# Patient Record
Sex: FEMALE | Race: BLACK OR AFRICAN AMERICAN | NOT HISPANIC OR LATINO
[De-identification: names, ages, dates, MRNs, and addresses within clinical notes are randomized per-mention and may not be internally consistent; named-entity substitution may affect disease eponyms.]

---

## 2018-01-08 ENCOUNTER — APPOINTMENT (OUTPATIENT)
Dept: ANTEPARTUM | Facility: CLINIC | Age: 33
End: 2018-01-08
Payer: COMMERCIAL

## 2018-01-08 ENCOUNTER — ASOB RESULT (OUTPATIENT)
Age: 33
End: 2018-01-08

## 2018-01-08 PROCEDURE — 36416 COLLJ CAPILLARY BLOOD SPEC: CPT

## 2018-01-08 PROCEDURE — 76813 OB US NUCHAL MEAS 1 GEST: CPT

## 2018-01-08 PROCEDURE — 76801 OB US < 14 WKS SINGLE FETUS: CPT

## 2018-02-21 ENCOUNTER — ASOB RESULT (OUTPATIENT)
Age: 33
End: 2018-02-21

## 2018-02-21 ENCOUNTER — APPOINTMENT (OUTPATIENT)
Dept: ANTEPARTUM | Facility: CLINIC | Age: 33
End: 2018-02-21
Payer: COMMERCIAL

## 2018-02-21 PROCEDURE — 76811 OB US DETAILED SNGL FETUS: CPT

## 2018-07-08 ENCOUNTER — TRANSCRIPTION ENCOUNTER (OUTPATIENT)
Age: 33
End: 2018-07-08

## 2018-07-12 ENCOUNTER — TRANSCRIPTION ENCOUNTER (OUTPATIENT)
Age: 33
End: 2018-07-12

## 2019-10-15 ENCOUNTER — APPOINTMENT (OUTPATIENT)
Dept: ANTEPARTUM | Facility: CLINIC | Age: 34
End: 2019-10-15
Payer: COMMERCIAL

## 2019-10-15 ENCOUNTER — ASOB RESULT (OUTPATIENT)
Age: 34
End: 2019-10-15

## 2019-10-15 PROCEDURE — 99241 OFFICE CONSULTATION NEW/ESTAB PATIENT 15 MIN: CPT | Mod: 25

## 2019-10-15 PROCEDURE — 76815 OB US LIMITED FETUS(S): CPT

## 2019-10-29 ENCOUNTER — APPOINTMENT (OUTPATIENT)
Dept: ANTEPARTUM | Facility: CLINIC | Age: 34
End: 2019-10-29

## 2019-11-19 ENCOUNTER — ASOB RESULT (OUTPATIENT)
Age: 34
End: 2019-11-19

## 2019-11-19 ENCOUNTER — APPOINTMENT (OUTPATIENT)
Dept: ANTEPARTUM | Facility: CLINIC | Age: 34
End: 2019-11-19
Payer: COMMERCIAL

## 2019-11-19 PROCEDURE — 76811 OB US DETAILED SNGL FETUS: CPT

## 2019-11-19 PROCEDURE — 99241 OFFICE CONSULTATION NEW/ESTAB PATIENT 15 MIN: CPT | Mod: 25

## 2020-03-16 ENCOUNTER — TRANSCRIPTION ENCOUNTER (OUTPATIENT)
Age: 35
End: 2020-03-16

## 2020-03-17 ENCOUNTER — TRANSCRIPTION ENCOUNTER (OUTPATIENT)
Age: 35
End: 2020-03-17

## 2021-04-21 ENCOUNTER — RESULT REVIEW (OUTPATIENT)
Age: 36
End: 2021-04-21

## 2021-09-04 ENCOUNTER — TRANSCRIPTION ENCOUNTER (OUTPATIENT)
Age: 36
End: 2021-09-04

## 2021-12-10 ENCOUNTER — TRANSCRIPTION ENCOUNTER (OUTPATIENT)
Age: 36
End: 2021-12-10

## 2022-05-15 ENCOUNTER — NON-APPOINTMENT (OUTPATIENT)
Age: 37
End: 2022-05-15

## 2022-12-13 ENCOUNTER — NON-APPOINTMENT (OUTPATIENT)
Age: 37
End: 2022-12-13

## 2023-01-04 ENCOUNTER — NON-APPOINTMENT (OUTPATIENT)
Age: 38
End: 2023-01-04

## 2023-01-06 ENCOUNTER — APPOINTMENT (OUTPATIENT)
Dept: OTOLARYNGOLOGY | Facility: CLINIC | Age: 38
End: 2023-01-06
Payer: COMMERCIAL

## 2023-01-06 PROCEDURE — 99204 OFFICE O/P NEW MOD 45 MIN: CPT

## 2023-01-06 NOTE — REVIEW OF SYSTEMS
[Ear Pain] : ear pain [Ear Itch] : ear itch [Dizziness] : dizziness [Negative] : Heme/Lymph [de-identified] : right ear, headaches

## 2023-01-06 NOTE — ASSESSMENT
[FreeTextEntry1] : 37F with covid. Given her covid, I performed a very limited PE. However, by history it sounds like ETD in the setting of covid.

## 2023-03-30 ENCOUNTER — APPOINTMENT (OUTPATIENT)
Dept: OTOLARYNGOLOGY | Facility: CLINIC | Age: 38
End: 2023-03-30
Payer: COMMERCIAL

## 2023-03-30 DIAGNOSIS — M54.2 CERVICALGIA: ICD-10-CM

## 2023-03-30 PROCEDURE — 99213 OFFICE O/P EST LOW 20 MIN: CPT

## 2023-03-30 RX ORDER — SEMAGLUTIDE 1.7 MG/.75ML
INJECTION, SOLUTION SUBCUTANEOUS
Refills: 0 | Status: ACTIVE | COMMUNITY

## 2023-03-30 NOTE — REASON FOR VISIT
[Subsequent Evaluation] : a subsequent evaluation for [FreeTextEntry2] : R ear otalgia, left neck lump

## 2023-03-30 NOTE — HISTORY OF PRESENT ILLNESS
[de-identified] : 37 year old female presents for 2 month follow up for right ear otalgia.\par Pt reports occasionally will have clogged feelings, relieved through irrigation. Has not used flonase or afrin. \par Denies right ear otalgia today. Left ear is stable at this time.\par Denies otalgia, otorrhea, ear infections, hearing loss. \par Pt states has had left neck lump for about 10 years. \par Has pain in area 2-3 weeks ago, but suddenly went away.\par Will occasionally have sudden neck pain that can last for a few days, can be exacerbated with stress, menses, or certain foods such as dairy\par Pain can travel to head. \par Reports has not grown in size. Has not noticed any size fluctuation with fevers or infection. \par Denies dysphagia, dyspnea. Denies recent fevers or infections. \par

## 2023-03-30 NOTE — PHYSICAL EXAM
[Normal] : no abnormal secretions [de-identified] : Palpable firm nodule in level 2, but pulsatile and may be prominent carotid bulb

## 2023-04-05 ENCOUNTER — OUTPATIENT (OUTPATIENT)
Dept: OUTPATIENT SERVICES | Facility: HOSPITAL | Age: 38
LOS: 1 days | End: 2023-04-05
Payer: COMMERCIAL

## 2023-04-05 ENCOUNTER — APPOINTMENT (OUTPATIENT)
Dept: CT IMAGING | Facility: CLINIC | Age: 38
End: 2023-04-05
Payer: COMMERCIAL

## 2023-04-05 DIAGNOSIS — M54.2 CERVICALGIA: ICD-10-CM

## 2023-04-05 PROCEDURE — 70491 CT SOFT TISSUE NECK W/DYE: CPT

## 2023-04-05 PROCEDURE — 70491 CT SOFT TISSUE NECK W/DYE: CPT | Mod: 26

## 2023-04-11 ENCOUNTER — NON-APPOINTMENT (OUTPATIENT)
Age: 38
End: 2023-04-11

## 2023-07-13 ENCOUNTER — APPOINTMENT (OUTPATIENT)
Dept: ORTHOPEDIC SURGERY | Facility: CLINIC | Age: 38
End: 2023-07-13

## 2023-11-01 ENCOUNTER — NON-APPOINTMENT (OUTPATIENT)
Age: 38
End: 2023-11-01

## 2023-12-06 ENCOUNTER — APPOINTMENT (OUTPATIENT)
Dept: OTOLARYNGOLOGY | Facility: CLINIC | Age: 38
End: 2023-12-06
Payer: COMMERCIAL

## 2023-12-06 VITALS
DIASTOLIC BLOOD PRESSURE: 77 MMHG | HEART RATE: 103 BPM | TEMPERATURE: 98 F | SYSTOLIC BLOOD PRESSURE: 126 MMHG | HEIGHT: 65 IN | WEIGHT: 156 LBS | BODY MASS INDEX: 25.99 KG/M2

## 2023-12-06 DIAGNOSIS — H93.11 TINNITUS, RIGHT EAR: ICD-10-CM

## 2023-12-06 PROCEDURE — 92557 COMPREHENSIVE HEARING TEST: CPT

## 2023-12-06 PROCEDURE — 31231 NASAL ENDOSCOPY DX: CPT

## 2023-12-06 PROCEDURE — 99214 OFFICE O/P EST MOD 30 MIN: CPT | Mod: 25

## 2023-12-06 PROCEDURE — 92567 TYMPANOMETRY: CPT | Mod: 22

## 2023-12-06 RX ORDER — AZELASTINE HYDROCHLORIDE 137 UG/1
137 SPRAY, METERED NASAL
Qty: 1 | Refills: 5 | Status: ACTIVE | COMMUNITY
Start: 2023-12-06 | End: 1900-01-01

## 2023-12-06 RX ORDER — METHYLPREDNISOLONE 4 MG/1
4 TABLET ORAL
Qty: 1 | Refills: 0 | Status: ACTIVE | COMMUNITY
Start: 2023-12-06 | End: 1900-01-01

## 2023-12-06 RX ORDER — FLUTICASONE PROPIONATE 50 UG/1
50 SPRAY, METERED NASAL DAILY
Qty: 1 | Refills: 2 | Status: ACTIVE | COMMUNITY
Start: 2023-12-06 | End: 1900-01-01

## 2024-01-10 ENCOUNTER — RESULT REVIEW (OUTPATIENT)
Age: 39
End: 2024-01-10

## 2024-01-10 ENCOUNTER — OUTPATIENT (OUTPATIENT)
Dept: OUTPATIENT SERVICES | Facility: HOSPITAL | Age: 39
LOS: 1 days | End: 2024-01-10
Payer: COMMERCIAL

## 2024-01-10 ENCOUNTER — APPOINTMENT (OUTPATIENT)
Dept: MRI IMAGING | Facility: CLINIC | Age: 39
End: 2024-01-10
Payer: COMMERCIAL

## 2024-01-10 DIAGNOSIS — H93.11 TINNITUS, RIGHT EAR: ICD-10-CM

## 2024-01-10 PROCEDURE — 70551 MRI BRAIN STEM W/O DYE: CPT

## 2024-01-10 PROCEDURE — 70544 MR ANGIOGRAPHY HEAD W/O DYE: CPT | Mod: 26,59

## 2024-01-10 PROCEDURE — 70544 MR ANGIOGRAPHY HEAD W/O DYE: CPT

## 2024-01-10 PROCEDURE — 70551 MRI BRAIN STEM W/O DYE: CPT | Mod: 26

## 2024-01-12 ENCOUNTER — NON-APPOINTMENT (OUTPATIENT)
Age: 39
End: 2024-01-12

## 2024-01-30 ENCOUNTER — APPOINTMENT (OUTPATIENT)
Dept: OTOLARYNGOLOGY | Facility: CLINIC | Age: 39
End: 2024-01-30
Payer: COMMERCIAL

## 2024-01-30 VITALS
HEIGHT: 65 IN | DIASTOLIC BLOOD PRESSURE: 73 MMHG | WEIGHT: 156 LBS | HEART RATE: 81 BPM | SYSTOLIC BLOOD PRESSURE: 117 MMHG | BODY MASS INDEX: 25.99 KG/M2

## 2024-01-30 DIAGNOSIS — J34.3 HYPERTROPHY OF NASAL TURBINATES: ICD-10-CM

## 2024-01-30 DIAGNOSIS — J34.2 DEVIATED NASAL SEPTUM: ICD-10-CM

## 2024-01-30 DIAGNOSIS — R09.81 NASAL CONGESTION: ICD-10-CM

## 2024-01-30 DIAGNOSIS — H93.A1 PULSATILE TINNITUS, RIGHT EAR: ICD-10-CM

## 2024-01-30 DIAGNOSIS — H93.8X1 OTHER SPECIFIED DISORDERS OF RIGHT EAR: ICD-10-CM

## 2024-01-30 DIAGNOSIS — H69.90 UNSPECIFIED EUSTACHIAN TUBE DISORDER, UNSPECIFIED EAR: ICD-10-CM

## 2024-01-30 PROCEDURE — 92567 TYMPANOMETRY: CPT

## 2024-01-30 PROCEDURE — 92557 COMPREHENSIVE HEARING TEST: CPT

## 2024-01-30 PROCEDURE — 99213 OFFICE O/P EST LOW 20 MIN: CPT

## 2024-01-30 NOTE — ASSESSMENT
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 information given for Dr. Guillermo she has not yet made an appt  Nasal congestion - c/w Flonase daily - can use Azelastine as needed  f/up prn any issues

## 2024-01-30 NOTE — END OF VISIT
[FreeTextEntry3] : I personally saw and examined SHANTAL LYNCH in detail.  I spoke to JAMSHID Zavala regarding the assessment and plan of care. I performed the procedures and relevant physical exam.  I have reviewed the above assessment and plan of care and I agree.  I have made changes to the body of the note wherever necessary and appropriate.

## 2024-01-30 NOTE — DATA REVIEWED
[de-identified] : 12/6/23: WNL tymp AU. Hearing is WNL in the left ear and moderate rising to WNL MHL in the right ear. ETF abnormal AU 01/30/24: wnl [de-identified] : 1/10/24 MRI / MRA / MRV shows R IAC type 2 vascular loop

## 2024-01-30 NOTE — PHYSICAL EXAM
[] : septum deviated to the left [Normal] : no abnormal secretions [de-identified] : bilateral inferior turbinate hypertrophy

## 2024-01-30 NOTE — HISTORY OF PRESENT ILLNESS
[de-identified] : Ms. LYNCH is a 38 year female with R ear clogging which started in October, she had seen Dr. Srivastava and was prescribed Afrin and Flonase, which worked well, this episode has persisted despite Flonase daily for 2 months, Claritin D, sinus rinse twice daily. When this episode started she was not sick, since then her kids have been sick and she is now congested as well. occ pain. she also has a whooshing noise in R ear as well which is constant but intensity changes and is pulsatile. she has blood streaking both sides - denies otalgia, otorrhea, vertigo, - denies sig ear history [FreeTextEntry1] : pt here for f/u she is still using Flonase daily, she used Azelastine for 4 weeks and now using sinus rinse 1-2 x per week, she feels the ringing and clogging is 50% better today

## 2024-04-23 ENCOUNTER — NON-APPOINTMENT (OUTPATIENT)
Age: 39
End: 2024-04-23

## 2024-07-07 ENCOUNTER — NON-APPOINTMENT (OUTPATIENT)
Age: 39
End: 2024-07-07

## 2025-01-05 ENCOUNTER — NON-APPOINTMENT (OUTPATIENT)
Age: 40
End: 2025-01-05

## 2025-01-15 ENCOUNTER — NON-APPOINTMENT (OUTPATIENT)
Age: 40
End: 2025-01-15

## 2025-01-24 ENCOUNTER — TRANSCRIPTION ENCOUNTER (OUTPATIENT)
Age: 40
End: 2025-01-24

## 2025-02-02 ENCOUNTER — NON-APPOINTMENT (OUTPATIENT)
Age: 40
End: 2025-02-02

## 2025-04-08 ENCOUNTER — NON-APPOINTMENT (OUTPATIENT)
Age: 40
End: 2025-04-08

## 2025-04-08 ENCOUNTER — APPOINTMENT (OUTPATIENT)
Dept: NEUROSURGERY | Facility: CLINIC | Age: 40
End: 2025-04-08
Payer: COMMERCIAL

## 2025-04-08 VITALS
BODY MASS INDEX: 31.02 KG/M2 | DIASTOLIC BLOOD PRESSURE: 78 MMHG | OXYGEN SATURATION: 99 % | WEIGHT: 193 LBS | HEART RATE: 82 BPM | HEIGHT: 66 IN | SYSTOLIC BLOOD PRESSURE: 122 MMHG

## 2025-04-08 DIAGNOSIS — H93.A1 PULSATILE TINNITUS, RIGHT EAR: ICD-10-CM

## 2025-04-08 PROCEDURE — 99204 OFFICE O/P NEW MOD 45 MIN: CPT
